# Patient Record
Sex: MALE | Race: BLACK OR AFRICAN AMERICAN | NOT HISPANIC OR LATINO | Employment: PART TIME
[De-identification: names, ages, dates, MRNs, and addresses within clinical notes are randomized per-mention and may not be internally consistent; named-entity substitution may affect disease eponyms.]

---
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
## 2024-11-17 ENCOUNTER — HOSPITAL ENCOUNTER (EMERGENCY)
Facility: CLINIC | Age: 19
Discharge: HOME | End: 2024-11-17
Attending: FAMILY MEDICINE | Admitting: FAMILY MEDICINE

## 2024-11-17 VITALS
OXYGEN SATURATION: 99 % | SYSTOLIC BLOOD PRESSURE: 151 MMHG | HEART RATE: 92 BPM | RESPIRATION RATE: 18 BRPM | HEIGHT: 67 IN | TEMPERATURE: 98.8 F | DIASTOLIC BLOOD PRESSURE: 91 MMHG

## 2024-11-17 DIAGNOSIS — K12.1 MOUTH ULCERS: ICD-10-CM

## 2024-11-17 LAB
FLUAV RNA SPEC QL NAA+PROBE: NEGATIVE
FLUBV RNA RESP QL NAA+PROBE: NEGATIVE
GROUP A STREP BY PCR: NOT DETECTED
RSV RNA SPEC NAA+PROBE: NEGATIVE
SARS-COV-2 RNA RESP QL NAA+PROBE: NEGATIVE

## 2024-11-17 PROCEDURE — 87651 STREP A DNA AMP PROBE: CPT | Performed by: EMERGENCY MEDICINE

## 2024-11-17 PROCEDURE — 87637 SARSCOV2&INF A&B&RSV AMP PRB: CPT | Performed by: EMERGENCY MEDICINE

## 2024-11-17 PROCEDURE — 99284 EMERGENCY DEPT VISIT MOD MDM: CPT | Performed by: FAMILY MEDICINE

## 2024-11-17 RX ORDER — VALACYCLOVIR HYDROCHLORIDE 1 G/1
1000 TABLET, FILM COATED ORAL 2 TIMES DAILY
Qty: 20 TABLET | Refills: 0 | Status: SHIPPED | OUTPATIENT
Start: 2024-11-17 | End: 2024-11-27

## 2024-11-17 RX ORDER — PREDNISONE 20 MG/1
TABLET ORAL
Qty: 10 TABLET | Refills: 0 | Status: SHIPPED | OUTPATIENT
Start: 2024-11-17

## 2024-11-17 ASSESSMENT — COLUMBIA-SUICIDE SEVERITY RATING SCALE - C-SSRS
6. HAVE YOU EVER DONE ANYTHING, STARTED TO DO ANYTHING, OR PREPARED TO DO ANYTHING TO END YOUR LIFE?: NO
2. HAVE YOU ACTUALLY HAD ANY THOUGHTS OF KILLING YOURSELF IN THE PAST MONTH?: NO
1. IN THE PAST MONTH, HAVE YOU WISHED YOU WERE DEAD OR WISHED YOU COULD GO TO SLEEP AND NOT WAKE UP?: NO

## 2024-11-17 NOTE — Clinical Note
Jose Baum was seen and treated in our emergency department on 11/17/2024.  He may return to work on 11/20/2024.       If you have any questions or concerns, please don't hesitate to call.      Thuan Paiz MD

## 2024-11-18 NOTE — ED TRIAGE NOTES
Patient arrives to the ER with a sore throat that started last Wednesday or Thursday. He had a fever the first day, but none since then. Last took Tylenol about 4 hours ago and ibuprofen about 45 min PTA.     Triage Assessment (Adult)       Row Name 11/17/24 7350          Triage Assessment    Airway WDL WDL        Respiratory WDL    Respiratory WDL WDL        Skin Circulation/Temperature WDL    Skin Circulation/Temperature WDL WDL        Cardiac WDL    Cardiac WDL WDL        Peripheral/Neurovascular WDL    Peripheral Neurovascular WDL WDL

## 2024-11-18 NOTE — ED PROVIDER NOTES
"Emergency Department Midlevel Supervisory Note     I had a face to face encounter with this patient seen by the Advanced Practice Provider (SKY). I personally made/approved the management plan and take responsibility for the patient management. I personally saw patient and performed a substantive portion of the visit including all aspects of the medical decision making.     ED Course:  11:17 PM  Irina Duron PA-C staffed patient with me. I agree with their assessment and plan of management, and I will see the patient. I met with the patient to introduce myself, gather additional history, perform my initial exam, and discuss the plan.     Procedures:  I was present for the key portions of procedures documented in SKY/midlevel note, see midlevel note for further details.    MDM:  Patient presents with sore throat for the last couple of days, pain with swallowing, feels like lips are swollen as well.  On exam here, patient has marked cervical adenopathy along with numerous ulcerations of the soft palate, uvula, and tonsils.  Strep test is negative and patient does not have an exudative pharyngitis.  Likely viral process, consider HSV or could be represent herpangina as well.  No lesions on the hands to suggest hand-foot-and-mouth.  Patient will be started on prednisone for pain and inflammation as well as Valtrex       1. Mouth ulcers        Brief HPI:     Jose Baum is a 19 year old male who presents for evaluation of throat pain      Brief Physical Exam: BP (!) 151/91   Pulse 92   Temp 98.8  F (37.1  C) (Temporal)   Resp 18   Ht 1.702 m (5' 7\")   SpO2 99%   Physical Exam  Vitals and nursing note reviewed.   Constitutional:       Appearance: Normal appearance.   HENT:      Head: Normocephalic and atraumatic.      Right Ear: External ear normal.      Left Ear: External ear normal.      Nose: Nose normal.      Mouth/Throat:      Comments: Ulcerations on the soft palate, uvula, and tonsils  Eyes:      " Extraocular Movements: Extraocular movements intact.      Conjunctiva/sclera: Conjunctivae normal.      Pupils: Pupils are equal, round, and reactive to light.   Neck:      Comments: Marked anterior cervical adenopathy bilaterally  Pulmonary:      Effort: Pulmonary effort is normal.   Musculoskeletal:         General: No swelling or deformity. Normal range of motion.      Cervical back: Normal range of motion.   Lymphadenopathy:      Cervical: Cervical adenopathy present.   Neurological:      General: No focal deficit present.      Mental Status: He is alert and oriented to person, place, and time. Mental status is at baseline.   Psychiatric:         Mood and Affect: Mood normal.         Behavior: Behavior normal.         Thought Content: Thought content normal.           Labs and Imaging:  Results for orders placed or performed during the hospital encounter of 11/17/24   Influenza A/B, RSV, & SARS-CoV2 PCR (COVID-19) Nasopharyngeal    Specimen: Nasopharyngeal; Swab   Result Value Ref Range    Influenza A PCR Negative Negative    Influenza B PCR Negative Negative    RSV PCR Negative Negative    SARS CoV2 PCR Negative Negative   Group A Streptococcus PCR Throat Swab    Specimen: Throat; Swab   Result Value Ref Range    Group A strep by PCR Not Detected Not Detected         Thuan Paiz M.D.  [facility] EMERGENCY ROOM  [address] [id number] [phone]     Thuan Paiz MD  11/17/24 5870

## 2024-11-18 NOTE — ED PROVIDER NOTES
"EMERGENCY DEPARTMENT ENCOUNTER      NAME: Jose Baum  AGE: 19 year old male  YOB: 2005  MRN: [id number]  EVALUATION DATE & TIME: No admission date for patient encounter.    PCP: No Ref-Primary, Physician    ED PROVIDER: Irina Duron PA-C      Chief Complaint   Patient presents with    Pharyngitis     FINAL IMPRESSION:  1. Mouth ulcers        ED COURSE & MEDICAL DECISION MAKING:    Pertinent Labs & Imaging studies reviewed. (See chart for details)  19 year old male presents to the Emergency Department for evaluation of sore throat.  4 days ago patient has had \"canker sores \"on the back of his throat.  Patient reports that it hurts to swallow.  Patient's mother notes that his lips are more swollen than normal.  No one around him is sick.  Patient had a fever 3 days ago which is now resolved.  Patient had oral sex 3 to 4 months ago and has not been active since.  Denies all other symptoms.  Vital signs reviewed and patient is hypertensive most likely secondary to pain.  Afebrile.  On exam, uvula is midline.  Trachea is midline.  Posterior oropharynx is not erythematous.  Sores on bilateral tonsils.  No tonsillar edema or exudate.  Palate is soft.  Normal range of motion of the neck.  Normal rate.  Lungs are clear to auscultation bilaterally.  Patient was all 4 extremities without difficulty.  Patient is alert and answering questions appropriately.    Differential diagnosis includes COVID, influenza, RSV, strep, herpes, hand-foot-and-mouth.  COVID, influenza and RSV and strep was negative.  Viral process.  Considered HSV or could be representing herpangina. No lesions on thee hands to suggest hand-foot-and-mouth.  Patient was educated on his results.  Plan will be to discharge the patient with Valtrex as well as prednisone.  Patient was educated on his prescribed medications.  Patient will follow-up with his primary care doctor discussed ED visit as soon as possible.  Patient return to the ED if " "new symptoms develop or symptoms worsen.  Patient agrees with plan.  All questions answered.    ED COURSE:   10:35 PM I saw the patient. Staffed with Dr. Paiz.   11:11 PM Patient will be discharged home.  Patient agrees with plan.  All questions answered.       At the conclusion of the encounter I discussed the results of all of the tests and the disposition. The questions were answered. The patient or family acknowledged understanding and was agreeable with the care plan.     0 minutes of critical care time       Medical Decision Making  Obtained supplemental history:Supplemental history obtained?: No  Reviewed external records: External records reviewed?: No  Care impacted by chronic illness:Documented in Chart  Care significantly affected by social determinants of health:N/A  Did you consider but not order tests?: Work up considered but not performed and documented in chart, if applicable  Did you interpret images independently?: Independent interpretation of ECG and images noted in documentation, when applicable.  Consultation discussion with other provider:Did you involve another provider (consultant, MH, pharmacy, etc.)?: I discussed the care with another health care provider, see documentation for details.  Discharge. I prescribed additional prescription strength medication(s) as charted. See documentation for any additional details.    Not Applicable    MEDICATIONS GIVEN IN THE EMERGENCY:  Medications - No data to display    NEW PRESCRIPTIONS STARTED AT TODAY'S ER VISIT  New Prescriptions    No medications on file          =================================================================    HPI    Patient information was obtained from: patient     Use of : N/A       Jose Baum is a 19 year old male with a pertinent history of Osgood-Schlatter's disease of knees bilaterally who presents to this ED by walking for evaluation of pharyngitis.    Patient states he has had \"canker sores\" in the " "back of his throat for the past 4 days. He states it hurts to swallow but is able to swallow. He rates the pain at a 8/10. Patient also states his tongue hurts. He noticed his lips are a little swollen (present for the past four days). No one around sick. Patient had a fever on Thursday or Friday which was recorded at 100.2.     Patient states last time he had oral sex was 3-4 months ago and he has not been active for the last 3-4 months.     Patient denies chest pain, runny nose, cough, abdominal pain, or any other complaints at this time.       REVIEW OF SYSTEMS   As per HPI    PAST MEDICAL HISTORY:  No past medical history on file.    PAST SURGICAL HISTORY:  No past surgical history on file.        CURRENT MEDICATIONS:    No current outpatient medications on file.      ALLERGIES:  No Known Allergies    FAMILY HISTORY:  No family history on file.    SOCIAL HISTORY:   Social History     Socioeconomic History    Marital status: Single     Social Drivers of Health      Received from [facility]    Financial Resource Strain    Received from [facility]    Social Connections       VITALS:  BP (!) 151/91   Pulse 92   Temp 98.8  F (37.1  C) (Temporal)   Resp 18   Ht 1.702 m (5' 7\")   SpO2 99%     PHYSICAL EXAM    Physical Exam  Vitals and nursing note reviewed.   Constitutional:       General: He is not in acute distress.     Appearance: Normal appearance. He is not ill-appearing, toxic-appearing or diaphoretic.   HENT:      Head: Atraumatic.      Jaw: There is normal jaw occlusion. No trismus.      Comments: Palate is soft.      Right Ear: Hearing, ear canal and external ear normal.      Left Ear: Ear canal and external ear normal.      Nose: Nose normal.      Right Sinus: No maxillary sinus tenderness or frontal sinus tenderness.      Left Sinus: No maxillary sinus tenderness or frontal sinus tenderness.      Mouth/Throat:      Mouth: Mucous " membranes are moist.      Dentition: Normal dentition.      Tongue: No lesions. Tongue does not deviate from midline.      Palate: No mass and lesions.      Pharynx: Oropharynx is clear. Uvula midline. No pharyngeal swelling or oropharyngeal exudate.      Tonsils: No tonsillar exudate or tonsillar abscesses.      Comments: ulcerations on bilateral tonsils.  Eyes:      Conjunctiva/sclera: Conjunctivae normal.      Pupils: Pupils are equal, round, and reactive to light.   Neck:      Trachea: Trachea and phonation normal.   Cardiovascular:      Rate and Rhythm: Normal rate and regular rhythm.      Pulses: Normal pulses.      Heart sounds: Normal heart sounds. No murmur heard.     No friction rub. No gallop.   Pulmonary:      Effort: Pulmonary effort is normal.      Breath sounds: Normal breath sounds. No wheezing or rales.   Abdominal:      Tenderness: There is no abdominal tenderness. There is no guarding or rebound.   Musculoskeletal:      Cervical back: Full passive range of motion without pain and normal range of motion. No rigidity or crepitus. Normal range of motion.   Lymphadenopathy:      Cervical: No cervical adenopathy.   Skin:     General: Skin is dry.   Neurological:      Mental Status: He is alert. Mental status is at baseline.   Psychiatric:         Mood and Affect: Mood normal.         Thought Content: Thought content normal.          LAB:  All pertinent labs reviewed and interpreted.  Labs Ordered and Resulted from Time of ED Arrival to Time of ED Departure   INFLUENZA A/B, RSV AND SARS-COV2 PCR - Normal       Result Value    Influenza A PCR Negative      Influenza B PCR Negative      RSV PCR Negative      SARS CoV2 PCR Negative     GROUP A STREPTOCOCCUS PCR THROAT SWAB - Normal    Group A strep by PCR Not Detected          RADIOLOGY:  Reviewed all pertinent imaging. Please see official radiology report.  No orders to display           Deandre KONG, am serving as a scribe to document services  personally performed by Irina Duron PA-C, based on my observation and the provider's statements to me. I, Irina Duron PA-C, attest that Deandre Pimentelqui is acting in a scribe capacity, has observed my performance of the services and has documented them in accordance with my direction.    Irina Duron PA-C  [facility] EMERGENCY ROOM  [address] [id number] [phone]       Irina Duron PA-C  11/21/24 6523

## 2024-11-18 NOTE — DISCHARGE INSTRUCTIONS
Rest.  Orally hydrate.  Follow-up with your primary care doctor discuss your ED visit as soon as possible.  Return to the ED if new symptoms develop or symptoms worsen.  COVID, influenza and RSV was negative.  Strep was negative.    I prescribed you prednisone.  Please take the prednisone as prescribed.  I have prescribed you valacyclovir to treat your ulcers.  Take your prescribed medications as prescribed.

## 2025-05-18 ENCOUNTER — HEALTH MAINTENANCE LETTER (OUTPATIENT)
Age: 20
End: 2025-05-18